# Patient Record
Sex: MALE | Race: WHITE | NOT HISPANIC OR LATINO | Employment: OTHER | URBAN - METROPOLITAN AREA
[De-identification: names, ages, dates, MRNs, and addresses within clinical notes are randomized per-mention and may not be internally consistent; named-entity substitution may affect disease eponyms.]

---

## 2021-05-31 ENCOUNTER — RECORDS - HEALTHEAST (OUTPATIENT)
Dept: ADMINISTRATIVE | Facility: CLINIC | Age: 66
End: 2021-05-31

## 2023-06-28 ENCOUNTER — TELEPHONE (OUTPATIENT)
Dept: UROLOGY | Facility: CLINIC | Age: 68
End: 2023-06-28

## 2023-06-29 NOTE — TELEPHONE ENCOUNTER
Action June 29, 2023 JTV 9:39 AM    Action Taken CSS called patient. Patient did not . \[facility] [place] [facility]\"" LVM for patient to return call.      Action July 3, 2023 JTv 1:25 PM    Action Taken CSS called patient. Patient did not . \[facility] [place] [facility]\"" LVM for patient to return call.      Max attempt reached. Message was sent to Nurse with update.      Action July 5, 2023 jtv 11:02 AM    Action Taken Patient returned call and confirmed that he is being seen for Elevated PSA and Prostate consult. Patient states he has records with [facility].     \[facility] [place] [facility]\"" sent an urgent request to [facility] for records and Labs.   FAX: [phone]     Action July 10, 2023 JTV 1:30 PM    Action Taken \[facility] [place] [facility]\"" received records from [facility]. Records sent to scanning for processing.        MEDICAL RECORDS REQUEST   [facility]  [facility]  [address] [place] [address]  PHONE: [phone]  Fax: [phone]        FUTURE VISIT INFORMATION                                                       APPOINTMENT INFORMATION:    Date: 07/11/2023    Provider:  Ramin Villalobos MD    Reason for Visit/Diagnosis: Prostate consult. elevated PSA      RECORDS REQUESTED FOR VISIT                                                     NOTES  STATUS/DETAILS   OFFICE NOTE from other specialist  YES, 04/28/2023, 03/14/2019 -- IBIS STYLES MD @ [place]    MEDICATION LIST  YES   LABS     PSA  YES     PRE-VISIT CHECKLIST      Joint diagnostic appointment coordinated correctly          (ensure right order & amount of time) Yes   RECORD COLLECTION COMPLETE YES, RECORDS SENT TO URGENT SCANNING 07/10/2023

## 2023-06-30 ENCOUNTER — PRE VISIT (OUTPATIENT)
Dept: UROLOGY | Facility: CLINIC | Age: 68
End: 2023-06-30

## 2023-06-30 NOTE — TELEPHONE ENCOUNTER
Reason for visit: consult      Dx/Hx/Sx: kidney tumor    Records/imaging/labs/orders: in epic    At Rooming: virtual visit

## 2023-07-05 ENCOUNTER — TELEPHONE (OUTPATIENT)
Dept: UROLOGY | Facility: CLINIC | Age: 68
End: 2023-07-05
Payer: MEDICARE

## 2023-07-05 NOTE — CONFIDENTIAL NOTE
Spoke with patient and confirmed appointment for next Tuesday with Dr. Villalobos.  Patient provided email address for VV information exchange.    Brayden Chavarria, RN  RN Care Coordinator - Urology

## 2023-07-11 ENCOUNTER — PRE VISIT (OUTPATIENT)
Dept: UROLOGY | Facility: CLINIC | Age: 68
End: 2023-07-11

## 2023-07-11 ENCOUNTER — VIRTUAL VISIT (OUTPATIENT)
Dept: UROLOGY | Facility: CLINIC | Age: 68
End: 2023-07-11
Payer: MEDICARE

## 2023-07-11 DIAGNOSIS — R97.20 ELEVATED PROSTATE SPECIFIC ANTIGEN (PSA): Primary | ICD-10-CM

## 2023-07-11 PROCEDURE — 99203 OFFICE O/P NEW LOW 30 MIN: CPT | Mod: VID | Performed by: UROLOGY

## 2023-07-11 NOTE — PROGRESS NOTES
Vineet Shirley is a 68 year old male who is being evaluated via a billable video visit.      How would you like to obtain your AVS? MyChart  If the video visit is dropped, the invitation should be resent by: Text to cell phone: [phone]  Will anyone else be joining your video visit? No    Video Start Time: 4:13 PM        Chief Complaint:    Elevated PSA (Prostate Specific Antigen)         History of Present Illness:    Vineet Shirley is a very pleasant 68 year old male who presents with a history of elevated PSA. He reports this has risen over the past few years. Was 0.53 in 2006, 1.42 in 2019, 2.07 in 2021. More recently, this was mildly elevated to 4.14 in 4/2023, but on recheck a month later was down to 3.6, back into normal range. He notes no prior urologic workup. No significant urinary symptoms, but does note some weak stream and occasional incomplete emptying. He has a brother with prostate cancer who was treated with HIFU.      PSA  2006 - 0.53  2019 - 1.42  2021 - 2.07  4/2023 - 4.14  5/26/2023 - 3.6         Past Medical History:   No major medical problems.         Past Surgical History:   Knee replacement         Medications     No current outpatient medications on file.     No current facility-administered medications for this visit.          Family History:   Brother with prostate cancer         Social History:     Social History     Socioeconomic History     Marital status:      Spouse name: Not on file     Number of children: Not on file     Years of education: Not on file     Highest education level: Not on file   Occupational History     Not on file   Tobacco Use     Smoking status: Some Days     Types: Cigars     Smokeless tobacco: Never   Substance and Sexual Activity     Alcohol use: Not on file     Drug use: Not on file     Sexual activity: Not on file   Other Topics Concern     Not on file   Social History Narrative     Not on file     Social Determinants of Health     Financial Resource  Strain: Not on file   Food Insecurity: Not on file   Transportation Needs: Not on file   Physical Activity: Not on file   Stress: Not on file   Social Connections: Not on file   Intimate Partner Violence: Not on file   Housing Stability: Not on file     Retired from [employer].         Allergies:   Shellfish-derived products         Review of Systems:      Constitutional, HEENT, cardiovascular, pulmonary, gi and gu systems are negative, except as otherwise noted.         Physical Exam:     Vitals:  No vitals were obtained today due to virtual visit.    Physical Exam   GENERAL: Healthy, alert and no distress  EYES: Eyes grossly normal to inspection.  No discharge or erythema, or obvious scleral/conjunctival abnormalities.  RESP: No audible wheeze, cough, or visible cyanosis.  No visible retractions or increased work of breathing.    SKIN: Visible skin clear. No significant rash, abnormal pigmentation or lesions.  NEURO: Cranial nerves grossly intact.  Mentation and speech appropriate for age.  PSYCH: Mentation appears normal, affect normal/bright, judgement and insight intact, normal speech and appearance well-groomed.      Labs and Pathology:    I reviewed all applicable laboratory and pathology data and went over findings with patient    See PSA trend above      Outside and Past Medical records:    Review of prior external note(s) from - Outside records from [facility]  Review of the result(s) of each unique test - PSA         Assessment and Plan:     Assessment: 68 year old male with elevated PSA. This has gradually increased over the years to 4.14, but on recheck, was back down to 3.6. We had a long discussion about the utility of PSA screening.  We talked about the Pros and Cons.  We discussed the findings of the PLCO and EORTC studies.  We discussed the results of the [place] trial of treatment vs. observation in clinically detected prostate cancer as well as the  results of the PIVOT trial in the context of screen-detected cancer.  We discussed the recommendations by the AUA, and USPSTF. We also discussed the significant (2-6%) and rising risk of biopsy associated sepsis.    We discussed this new diagnosis of elevated PSA with uncertain prognosis, and the role of MRI in the diagnosis of prostate cancer and the potential for performing MRI-Ultrasound Fusion biopsy if suspicious lesions are noted.     At this point, particularly considering age-adjusted PSA ranges, even his most recent PSA levels are in appropriate ranges. While this does not rule out the possibility of prostate cancer, the risk of a clinically significant prostate cancer is very low. As such, no further workup is indicated at this time, and will plan follow-up in 1 year with PSA.    Plan:  Follow-up 1 year with PSA    Orders  Orders Placed This Encounter   Procedures     PSA tumor marker     Video-Visit Details    Type of service:  Video Visit    Video End Time:4:29 PM    Originating Location (pt. Location): Home    Distant Location (provider location):  On-site    Platform used for Video Visit: Yusuf Villalobos MD  Urology  [facility]

## 2023-07-11 NOTE — NURSING NOTE
Is the patient currently in the state of [place]? YES    Visit mode:VIDEO    If the visit is dropped, the patient can be reconnected by: VIDEO VISIT: Send to e-mail at: [email or web address]    Will anyone else be joining the visit? NO      How would you like to obtain your AVS? MyChart    Are changes needed to the allergy or medication list? NO    Reason for visit: Consult (New Urology, prostate consult)

## 2023-07-11 NOTE — LETTER
7/11/2023       RE: Vineet Shirley  [address] [facility] [address]     Dear Colleague,    Thank you for referring your patient, Vineet Shirley, to the [facility] [place] at [facility]. Please see a copy of my visit note below.    Vineet Shirley is a 68 year old male who is being evaluated via a billable video visit.      How would you like to obtain your AVS? MyChart  If the video visit is dropped, the invitation should be resent by: Text to cell phone: [phone]  Will anyone else be joining your video visit? No    Video Start Time: 4:13 PM        Chief Complaint:    Elevated PSA (Prostate Specific Antigen)         History of Present Illness:    Vineet Shirley is a very pleasant 68 year old male who presents with a history of elevated PSA. He reports this has risen over the past few years. Was 0.53 in 2006, 1.42 in 2019, 2.07 in 2021. More recently, this was mildly elevated to 4.14 in 4/2023, but on recheck a month later was down to 3.6, back into normal range. He notes no prior urologic workup. No significant urinary symptoms, but does note some weak stream and occasional incomplete emptying. He has a brother with prostate cancer who was treated with HIFU.      PSA  2006 - 0.53  2019 - 1.42  2021 - 2.07  4/2023 - 4.14  5/26/2023 - 3.6         Past Medical History:   No major medical problems.         Past Surgical History:   Knee replacement         Medications     No current outpatient medications on file.     No current facility-administered medications for this visit.          Family History:   Brother with prostate cancer         Social History:     Social History     Socioeconomic History     Marital status:      Spouse name: Not on file     Number of children: Not on file     Years of education: Not on file     Highest education level: Not on file   Occupational History     Not on file   Tobacco Use     Smoking status: Some Days      Types: Cigars     Smokeless tobacco: Never   Substance and Sexual Activity     Alcohol use: Not on file     Drug use: Not on file     Sexual activity: Not on file   Other Topics Concern     Not on file   Social History Narrative     Not on file     Social Determinants of Health     Financial Resource Strain: Not on file   Food Insecurity: Not on file   Transportation Needs: Not on file   Physical Activity: Not on file   Stress: Not on file   Social Connections: Not on file   Intimate Partner Violence: Not on file   Housing Stability: Not on file     Retired from running computer data storage company.         Allergies:   Shellfish-derived products         Review of Systems:      Constitutional, HEENT, cardiovascular, pulmonary, gi and gu systems are negative, except as otherwise noted.         Physical Exam:     Vitals:  No vitals were obtained today due to virtual visit.    Physical Exam   GENERAL: Healthy, alert and no distress  EYES: Eyes grossly normal to inspection.  No discharge or erythema, or obvious scleral/conjunctival abnormalities.  RESP: No audible wheeze, cough, or visible cyanosis.  No visible retractions or increased work of breathing.    SKIN: Visible skin clear. No significant rash, abnormal pigmentation or lesions.  NEURO: Cranial nerves grossly intact.  Mentation and speech appropriate for age.  PSYCH: Mentation appears normal, affect normal/bright, judgement and insight intact, normal speech and appearance well-groomed.      Labs and Pathology:    I reviewed all applicable laboratory and pathology data and went over findings with patient    See PSA trend above      Outside and Past Medical records:    Review of prior external note(s) from - Outside records from [facility]  Review of the result(s) of each unique test - PSA         Assessment and Plan:     Assessment: 68 year old male with elevated PSA. This has gradually increased over the years to 4.14, but on recheck, was back down  to 3.6. We had a long discussion about the utility of PSA screening.  We talked about the Pros and Cons.  We discussed the findings of the PLCO and EORTC studies.  We discussed the results of the [place] trial of treatment vs. observation in clinically detected prostate cancer as well as the results of the PIVOT trial in the context of screen-detected cancer.  We discussed the recommendations by the AUA, and USPSTF. We also discussed the significant (2-6%) and rising risk of biopsy associated sepsis.    We discussed this new diagnosis of elevated PSA with uncertain prognosis, and the role of MRI in the diagnosis of prostate cancer and the potential for performing MRI-Ultrasound Fusion biopsy if suspicious lesions are noted.     At this point, particularly considering age-adjusted PSA ranges, even his most recent PSA levels are in appropriate ranges. While this does not rule out the possibility of prostate cancer, the risk of a clinically significant prostate cancer is very low. As such, no further workup is indicated at this time, and will plan follow-up in 1 year with PSA.    Plan:  Follow-up 1 year with PSA    Orders  Orders Placed This Encounter   Procedures     PSA tumor marker     Video-Visit Details    Type of service:  Video Visit    Video End Time:4:29 PM    Originating Location (pt. Location): Home    Distant Location (provider location):  On-site    Platform used for Video Visit: Yusuf Villalobos MD  Urology  [facility]              Again, thank you for allowing me to participate in the care of your patient.      Sincerely,    Ramin Villalobos MD

## 2024-01-11 ENCOUNTER — TELEPHONE (OUTPATIENT)
Dept: UROLOGY | Facility: CLINIC | Age: 69
End: 2024-01-11
Payer: MEDICARE

## 2024-01-11 NOTE — TELEPHONE ENCOUNTER
Called to schedule one year follow-up with Dr. Villalobos with PSA. Patient declined to schedule.

## 2025-06-16 ENCOUNTER — TELEPHONE (OUTPATIENT)
Dept: ONCOLOGY | Facility: CLINIC | Age: 70
End: 2025-06-16
Payer: MEDICARE

## 2025-06-16 DIAGNOSIS — R97.20 ELEVATED PROSTATE SPECIFIC ANTIGEN (PSA): Primary | ICD-10-CM

## 2025-06-16 NOTE — TELEPHONE ENCOUNTER
[facility] Call Center    Phone Message    May a detailed message be left on voicemail: yes     Reason for Call: Other: Pt requesting to speak with Dr. Villalobos regarding his rising PSA. Pt states that he was told that he did not need an appt and he just had to let Dr. Villalobos know if his levels sixto. Thanks      Action Taken: Other: uro     Travel Screening: Not Applicable     Date of Service:

## 2025-06-19 NOTE — TELEPHONE ENCOUNTER
Pt was due for PSA and follow-up 7/2024, but declined to schedule.     Called pt and he reports the following PSA results:  2021: 2.07  2023: 4.14  A month later in 2023: 3.6  2024: 4.73  6/13/25: 6.44    Pt has PSA checked at annual physical through [facility].

## 2025-06-29 ENCOUNTER — HEALTH MAINTENANCE LETTER (OUTPATIENT)
Age: 70
End: 2025-06-29

## 2025-08-04 ENCOUNTER — PRE VISIT (OUTPATIENT)
Dept: UROLOGY | Facility: CLINIC | Age: 70
End: 2025-08-04
Payer: MEDICARE

## 2025-08-19 ENCOUNTER — OFFICE VISIT (OUTPATIENT)
Dept: UROLOGY | Facility: CLINIC | Age: 70
End: 2025-08-19
Payer: MEDICARE

## 2025-08-19 VITALS
SYSTOLIC BLOOD PRESSURE: 130 MMHG | DIASTOLIC BLOOD PRESSURE: 83 MMHG | HEIGHT: 70 IN | OXYGEN SATURATION: 98 % | BODY MASS INDEX: 28.63 KG/M2 | WEIGHT: 200 LBS | HEART RATE: 84 BPM

## 2025-08-19 DIAGNOSIS — R97.20 ELEVATED PROSTATE SPECIFIC ANTIGEN (PSA): Primary | ICD-10-CM

## 2025-08-19 ASSESSMENT — PAIN SCALES - GENERAL: PAINLEVEL_OUTOF10: NO PAIN (0)

## 2025-08-20 PROBLEM — R97.20 ELEVATED PROSTATE SPECIFIC ANTIGEN (PSA): Status: ACTIVE | Noted: 2025-08-20

## 2025-08-20 PROBLEM — E78.5 HYPERLIPIDEMIA: Status: ACTIVE | Noted: 2023-11-13

## 2025-08-20 PROBLEM — M17.11 OSTEOARTHRITIS OF RIGHT KNEE: Status: ACTIVE | Noted: 2023-11-13

## 2025-08-28 ENCOUNTER — ANCILLARY PROCEDURE (OUTPATIENT)
Dept: MRI IMAGING | Facility: CLINIC | Age: 70
End: 2025-08-28
Attending: UROLOGY
Payer: MEDICARE

## 2025-08-28 DIAGNOSIS — R97.20 ELEVATED PROSTATE SPECIFIC ANTIGEN (PSA): ICD-10-CM

## 2025-08-28 PROCEDURE — A9585 GADOBUTROL INJECTION: HCPCS | Performed by: UROLOGY

## 2025-08-28 PROCEDURE — 255N000002 HC RX 255 OP 636: Performed by: UROLOGY

## 2025-08-28 PROCEDURE — 72197 MRI PELVIS W/O & W/DYE: CPT

## 2025-08-28 PROCEDURE — 72197 MRI PELVIS W/O & W/DYE: CPT | Mod: 26 | Performed by: STUDENT IN AN ORGANIZED HEALTH CARE EDUCATION/TRAINING PROGRAM

## 2025-08-28 RX ORDER — GADOBUTROL 604.72 MG/ML
9 INJECTION INTRAVENOUS ONCE
Status: COMPLETED | OUTPATIENT
Start: 2025-08-28 | End: 2025-08-28

## 2025-08-28 RX ADMIN — GADOBUTROL 9 ML: 604.72 INJECTION INTRAVENOUS at 12:44
